# Patient Record
(demographics unavailable — no encounter records)

---

## 2024-12-26 NOTE — HISTORY OF PRESENT ILLNESS
[Home] : at home, [unfilled] , at the time of the visit. [Medical Office: ([facility])___] : at the medical office located in  [Verbal consent obtained from patient] : the patient, [unfilled] [de-identified] : seen for f/u  will be traveling to [facility] - leaving 24 returning 25  - needs Malaria prophylaxia   Grand mother  in [facility] at age 95 - she is traveling Friday this week 24 - returning Aug 13   hypertension  -on amlodipine 2.5  and KCL 10 meq  -needs refills  -denies any chest pain, shortness of breath, no palpitation now , or dizzy spells.   obesity-has started working out and watching diet last 2 months, last few pounds, he is much better since she started exercise, is a housewife. Takes care of 3 kids at home.   pre DM- watching diet , walking , running

## 2024-12-26 NOTE — ASSESSMENT
[FreeTextEntry1] : Travel advice  .Malaria Prophylaxis-  -discussed to were long sleeve cloths, avoid standing water bev , mosquito hybrid areas, use nets , mosquito repellents  -offered mefloquine 250 q weekly x start 1 week prior ,continue during stay for up to 4 weeks on return - reviewed side effects of medications including liver monitoring  .Travelers diarrhea -avoid tap water , drink boiled filtered water or bottled water, avoid cold food , salads, eat hot cooked food - if diarrhea starts - see MD for electrolyte and antibiotics  .Dengue fever disease risk educated - advised to avoid mosquito areas , use precaution .Flu vaccine offered  HTN-  --no cp sob palpitation or dizzy spells , no leg swelling  -continue amlodipine rx renewed - educated low salt diet , avoid canned , processed and fast food ,chips , bagged items ,  start exercise daily 30- 40 minutes  , loose weight